# Patient Record
Sex: MALE | Race: WHITE | NOT HISPANIC OR LATINO
[De-identification: names, ages, dates, MRNs, and addresses within clinical notes are randomized per-mention and may not be internally consistent; named-entity substitution may affect disease eponyms.]

---

## 2021-11-04 ENCOUNTER — NON-APPOINTMENT (OUTPATIENT)
Age: 81
End: 2021-11-04

## 2021-11-30 ENCOUNTER — APPOINTMENT (OUTPATIENT)
Dept: UROLOGY | Facility: CLINIC | Age: 81
End: 2021-11-30
Payer: MEDICARE

## 2021-11-30 VITALS
HEART RATE: 80 BPM | SYSTOLIC BLOOD PRESSURE: 153 MMHG | WEIGHT: 210 LBS | DIASTOLIC BLOOD PRESSURE: 72 MMHG | RESPIRATION RATE: 16 BRPM

## 2021-11-30 DIAGNOSIS — Z80.9 FAMILY HISTORY OF MALIGNANT NEOPLASM, UNSPECIFIED: ICD-10-CM

## 2021-11-30 DIAGNOSIS — Z78.9 OTHER SPECIFIED HEALTH STATUS: ICD-10-CM

## 2021-11-30 DIAGNOSIS — Z72.89 OTHER PROBLEMS RELATED TO LIFESTYLE: ICD-10-CM

## 2021-11-30 DIAGNOSIS — Z87.448 PERSONAL HISTORY OF OTHER DISEASES OF URINARY SYSTEM: ICD-10-CM

## 2021-11-30 DIAGNOSIS — Z86.79 PERSONAL HISTORY OF OTHER DISEASES OF THE CIRCULATORY SYSTEM: ICD-10-CM

## 2021-11-30 PROCEDURE — 99204 OFFICE O/P NEW MOD 45 MIN: CPT

## 2021-11-30 RX ORDER — HYDROCHLOROTHIAZIDE 12.5 MG/1
TABLET ORAL
Refills: 0 | Status: ACTIVE | COMMUNITY

## 2021-11-30 RX ORDER — UBIQUINOL 100 MG
CAPSULE ORAL
Refills: 0 | Status: ACTIVE | COMMUNITY

## 2021-11-30 RX ORDER — AMLODIPINE BESYLATE 5 MG/1
TABLET ORAL
Refills: 0 | Status: ACTIVE | COMMUNITY

## 2021-11-30 RX ORDER — SIMVASTATIN 80 MG/1
TABLET, FILM COATED ORAL
Refills: 0 | Status: ACTIVE | COMMUNITY

## 2021-11-30 RX ORDER — FINASTERIDE 1 MG/1
TABLET ORAL
Refills: 0 | Status: COMPLETED | COMMUNITY
End: 2021-11-30

## 2021-12-09 NOTE — LETTER BODY
[Dear  ___] : Dear  [unfilled], [FreeTextEntry1] : I had the pleasure of seeing your patient, CHI WHITE, in the office today.  \par \par Please see my office note below.\par \par Thank you for allowing me to participate in his care and please do not hesitate to contact me with any questions or concerns regarding his care.\par \par Sincerely,\par \par Moses Roche MD\par Chief of Urology, [facility]\par  of Urology\par [address] [place] [address]\[place] [address]\par PH:      [phone]\par Email:  [email or web address]

## 2021-12-09 NOTE — PHYSICAL EXAM
[General Appearance - Well Developed] : well developed [General Appearance - Well Nourished] : well nourished [Normal Appearance] : normal appearance [Well Groomed] : well groomed [General Appearance - In No Acute Distress] : no acute distress [Edema] : no peripheral edema [] : no respiratory distress [Respiration, Rhythm And Depth] : normal respiratory rhythm and effort [Exaggerated Use Of Accessory Muscles For Inspiration] : no accessory muscle use [Abdomen Soft] : soft [Abdomen Tenderness] : non-tender [Costovertebral Angle Tenderness] : no ~M costovertebral angle tenderness [No Prostate Nodules] : no prostate nodules [Prostate Size ___ gm] : prostate size [unfilled] gm

## 2021-12-09 NOTE — HISTORY OF PRESENT ILLNESS
[FreeTextEntry1] : Referred for urinary frequency.\par Bothersome at night, 3x.  Has daytime frequency but not bothersome.\par Had previously been on tamsulosin and finasteride without improvement.\par Then switched to oxybutynin but was not effective and discontinued, now back on finasteride.\par No UTI, kidney stones.\par

## 2021-12-09 NOTE — ASSESSMENT
[FreeTextEntry1] : Reviewed labs from April 2021: PSA 2.04 ng/mL, Creatinine 1.43 mg/dL.\par Start mirabegron 25 mg once daily.\par Goals of medication reviewed.  Discussed the potential adverse effects of the medication.  Discussed the proper administration of the medication.\par Discontinue finasteride.\par Follow up in 3 months.

## 2022-01-26 ENCOUNTER — NON-APPOINTMENT (OUTPATIENT)
Age: 82
End: 2022-01-26

## 2022-01-26 RX ORDER — MIRABEGRON 25 MG/1
25 TABLET, FILM COATED, EXTENDED RELEASE ORAL
Qty: 90 | Refills: 3 | Status: DISCONTINUED | COMMUNITY
Start: 2021-11-30 | End: 2022-01-26

## 2022-02-28 ENCOUNTER — APPOINTMENT (OUTPATIENT)
Dept: UROLOGY | Facility: CLINIC | Age: 82
End: 2022-02-28
Payer: MEDICARE

## 2022-02-28 PROCEDURE — 99214 OFFICE O/P EST MOD 30 MIN: CPT

## 2022-02-28 RX ORDER — OXYBUTYNIN CHLORIDE 10 MG/1
10 TABLET, EXTENDED RELEASE ORAL DAILY
Qty: 90 | Refills: 3 | Status: COMPLETED | COMMUNITY
Start: 2022-01-26 | End: 2022-02-28

## 2022-02-28 NOTE — ASSESSMENT
[FreeTextEntry1] : Discontinue oxybutynin ER.\par Recommend to start trospium 20 mg qHS.\par Goals of medication reviewed.  Discussed the potential adverse effects of the medication.  Discussed the proper administration of the medication.\par Follow up in three months.

## 2022-02-28 NOTE — HISTORY OF PRESENT ILLNESS
[FreeTextEntry1] : Patient is a 81 year old man presents today for increased urinary frequency, \par \par On last visit was started on Myrbetriq. He remained on it for almost 2 months. States it made him very tired. He called and was restarted on Oxybutynin 10 mg ER. He feels that his urinary frequency remains unchanged. \par Patient also has tried tamsulosin and finasteride in the past. \par Stream is fair. Feels that he empties his bladder. Occasionally has some increased urinary frequency. Denies urgency. \par \par Nocturia x 1-3 which he feels is most bothersome.

## 2022-05-26 ENCOUNTER — RX RENEWAL (OUTPATIENT)
Age: 82
End: 2022-05-26

## 2022-05-27 ENCOUNTER — RX RENEWAL (OUTPATIENT)
Age: 82
End: 2022-05-27

## 2022-05-31 ENCOUNTER — APPOINTMENT (OUTPATIENT)
Dept: UROLOGY | Facility: CLINIC | Age: 82
End: 2022-05-31
Payer: MEDICARE

## 2022-05-31 VITALS
HEART RATE: 86 BPM | SYSTOLIC BLOOD PRESSURE: 148 MMHG | OXYGEN SATURATION: 98 % | RESPIRATION RATE: 17 BRPM | DIASTOLIC BLOOD PRESSURE: 86 MMHG | TEMPERATURE: 98.1 F

## 2022-05-31 PROCEDURE — 99214 OFFICE O/P EST MOD 30 MIN: CPT

## 2022-05-31 RX ORDER — SOLIFENACIN SUCCINATE 5 MG/1
5 TABLET ORAL
Qty: 90 | Refills: 3 | Status: ACTIVE | COMMUNITY
Start: 2022-05-31 | End: 1900-01-01

## 2022-06-01 ENCOUNTER — NON-APPOINTMENT (OUTPATIENT)
Age: 82
End: 2022-06-01

## 2022-06-05 RX ORDER — TROSPIUM CHLORIDE 20 MG/1
20 TABLET, FILM COATED ORAL
Qty: 90 | Refills: 0 | Status: COMPLETED | COMMUNITY
Start: 2022-02-28 | End: 2022-06-05

## 2022-06-05 NOTE — HISTORY OF PRESENT ILLNESS
[FreeTextEntry1] : Here for three month follow up visit.\par Primary complaint is urinary frequency and nocturia.\par Previously failed tamsulosin, finasteride, mirabegron and oxybutynin.\par At the last visit, started on trospium but significant adverse effects of dry mouth. Noted some mild improvement in nocturia.  Denies gross hematuria, dysuria. No abdominal or flank pain.

## 2022-06-05 NOTE — ASSESSMENT
[FreeTextEntry1] : Discontinue trospium 20 mg.\par Start Vesicare 5 mg qHS.\par Goals of medication reviewed.  Discussed the potential adverse effects of the medication.  Discussed the proper administration of the medication.\par \par Follow up in three months.

## 2022-07-01 ENCOUNTER — NON-APPOINTMENT (OUTPATIENT)
Age: 82
End: 2022-07-01

## 2022-08-25 ENCOUNTER — APPOINTMENT (OUTPATIENT)
Dept: UROLOGY | Facility: CLINIC | Age: 82
End: 2022-08-25

## 2022-12-15 ENCOUNTER — APPOINTMENT (OUTPATIENT)
Dept: UROLOGY | Facility: CLINIC | Age: 82
End: 2022-12-15

## 2022-12-15 VITALS
TEMPERATURE: 98.5 F | BODY MASS INDEX: 32.96 KG/M2 | SYSTOLIC BLOOD PRESSURE: 155 MMHG | RESPIRATION RATE: 17 BRPM | WEIGHT: 210 LBS | HEIGHT: 67 IN | DIASTOLIC BLOOD PRESSURE: 71 MMHG | HEART RATE: 89 BPM

## 2022-12-15 DIAGNOSIS — R35.1 BENIGN PROSTATIC HYPERPLASIA WITH LOWER URINARY TRACT SYMPMS: ICD-10-CM

## 2022-12-15 DIAGNOSIS — N40.1 BENIGN PROSTATIC HYPERPLASIA WITH LOWER URINARY TRACT SYMPMS: ICD-10-CM

## 2022-12-15 DIAGNOSIS — R35.0 FREQUENCY OF MICTURITION: ICD-10-CM

## 2022-12-15 PROCEDURE — 99215 OFFICE O/P EST HI 40 MIN: CPT

## 2022-12-15 PROCEDURE — 51798 US URINE CAPACITY MEASURE: CPT

## 2022-12-15 NOTE — PHYSICAL EXAM
[General Appearance - Well Developed] : well developed [Normal Appearance] : normal appearance [Abdomen Soft] : soft [Edema] : no peripheral edema [] : no respiratory distress [Not Anxious] : not anxious [Prostate Size ___ gm] : prostate size [unfilled] gm [Prostate Tenderness] : the prostate was not tender [No Prostate Nodules] : no prostate nodules [FreeTextEntry1] : slow gait

## 2022-12-15 NOTE — HISTORY OF PRESENT ILLNESS
[FreeTextEntry1] : 82 yr male patient with hx of HTN and HLD presents to office today for complaints of urinary frequency x 3 yrs. had tried and failed Trospium, oxybutynin, Myrbetriq and solifenacin with either no benefits or severe dry mouth and constipation. Referred by Dr. Roche \par \par pt also stopped Tamsulosin and Finasteride\par s/p cataract left eye surgery 4 weeks ago, pending right eye cataract. \par \par DTF q3hrs \par Nocturia 4-5x \par rarely any incontinence \par moderate to strong urine stream \par no need to strain to void, but has tendency to strain a bit toward the end\par Denies constipation \par \par daily fluid intake: 1 cup of regular coffee, no tea, 32 oz of water\par \par Never smoker \par \par Never had cysto \par \par PVR = 4 cc

## 2022-12-15 NOTE — ASSESSMENT
[FreeTextEntry1] : Plan:\par \par PVR 4 \par \par Encouraged to switch to decaf coffee as caffeine is bladder irritant \par \par will hold off on alpha blocker as patient is getting cataract surgery soon \par \par Discussed on resuming Finasteride 5mg po daily\par \par Discussed third line therapies for OAB, including tibial nerve stimulation, intravesical bladder Botox, and sacral neuromodulation\par \par pt will discuss with family and let notify team once decision is made \par  \par \par \par \par

## 2022-12-15 NOTE — END OF VISIT
[Time Spent: ___ minutes] : I have spent [unfilled] minutes of time on the encounter. [FreeTextEntry3] : BPH OAB\par off tamsulosin and finasteride - cataract surgery upcomming \par \par MADDY = 40g\par \par discussed 3rd line oab therapies - provided [facility]EM[facility]\par he will dsicuss decision with his wife\par \par \par The total time spent with the patient includes face to face time as well as time for documentation, ordering medications/labs/procedures, and care coordination, but I acknowledge it does not include time spent on any procedures performed (eg PVR, UDS, Cystoscopy, catheter changes, etc).  Time includes reviewing the chart prior to visit, documentation, and correspondence.\par

## 2024-11-05 ENCOUNTER — NON-APPOINTMENT (OUTPATIENT)
Age: 84
End: 2024-11-05

## 2024-12-09 ENCOUNTER — APPOINTMENT (OUTPATIENT)
Dept: UROLOGY | Facility: CLINIC | Age: 84
End: 2024-12-09
Payer: MEDICARE

## 2024-12-09 VITALS
RESPIRATION RATE: 17 BRPM | DIASTOLIC BLOOD PRESSURE: 72 MMHG | OXYGEN SATURATION: 97 % | HEIGHT: 67 IN | SYSTOLIC BLOOD PRESSURE: 134 MMHG | HEART RATE: 70 BPM | TEMPERATURE: 98.1 F

## 2024-12-09 DIAGNOSIS — R97.20 ELEVATED PROSTATE, SPECIFIC ANTIGEN [PSA]: ICD-10-CM

## 2024-12-09 DIAGNOSIS — R35.0 FREQUENCY OF MICTURITION: ICD-10-CM

## 2024-12-09 DIAGNOSIS — N40.1 BENIGN PROSTATIC HYPERPLASIA WITH LOWER URINARY TRACT SYMPMS: ICD-10-CM

## 2024-12-09 DIAGNOSIS — R35.1 BENIGN PROSTATIC HYPERPLASIA WITH LOWER URINARY TRACT SYMPMS: ICD-10-CM

## 2024-12-09 PROCEDURE — 99212 OFFICE O/P EST SF 10 MIN: CPT

## 2024-12-09 PROCEDURE — G2211 COMPLEX E/M VISIT ADD ON: CPT

## 2024-12-24 PROBLEM — F10.90 ALCOHOL USE: Status: ACTIVE | Noted: 2021-11-30

## 2025-08-14 ENCOUNTER — APPOINTMENT (OUTPATIENT)
Dept: UROLOGY | Facility: CLINIC | Age: 85
End: 2025-08-14

## 2025-08-14 VITALS
HEIGHT: 67 IN | OXYGEN SATURATION: 91 % | RESPIRATION RATE: 17 BRPM | WEIGHT: 215 LBS | HEART RATE: 87 BPM | DIASTOLIC BLOOD PRESSURE: 67 MMHG | BODY MASS INDEX: 33.74 KG/M2 | TEMPERATURE: 97.8 F | SYSTOLIC BLOOD PRESSURE: 156 MMHG

## 2025-08-14 DIAGNOSIS — R35.1 BENIGN PROSTATIC HYPERPLASIA WITH LOWER URINARY TRACT SYMPMS: ICD-10-CM

## 2025-08-14 DIAGNOSIS — N40.1 BENIGN PROSTATIC HYPERPLASIA WITH LOWER URINARY TRACT SYMPMS: ICD-10-CM

## 2025-08-14 PROCEDURE — 99213 OFFICE O/P EST LOW 20 MIN: CPT

## 2025-08-14 PROCEDURE — G2211 COMPLEX E/M VISIT ADD ON: CPT
